# Patient Record
(demographics unavailable — no encounter records)

---

## 2024-12-20 NOTE — PHYSICAL EXAM
[Normal Appearance] : normal appearance [Well Groomed] : well groomed [General Appearance - In No Acute Distress] : no acute distress [] : no rash

## 2024-12-20 NOTE — HISTORY OF PRESENT ILLNESS
[FreeTextEntry1] : Verbal consent given on 12/20/2024 at 15:32 by JACY RODRIGUEZ Patient [place] Provider [place], [place]  Pt comes in for urinary urgency, frequency worse in the morning. Pt currently not on alfuzosin or cialis. Pt having weak stream.   6/12/24 US PVR 56 otherwise normal renal bladder US  9/14/23 PSA 1.14 6/12/24 PSA 1.27

## 2025-02-28 NOTE — HISTORY OF PRESENT ILLNESS
[FreeTextEntry1] : Pt comes in for urinary urgency, frequency worse in the morning. Pt currently not on alfuzosin or cialis. Pt having weak stream. Pt concerned bc of urinary urgency at night while sleeping.  6/12/24 US PVR 56 otherwise normal renal bladder US  9/14/23 PSA 1.14 6/12/24 PSA 1.27

## 2025-06-06 NOTE — HISTORY OF PRESENT ILLNESS
[FreeTextEntry1] : Verbal consent given on 06/06/2025 at 15:40 by JACY RODRIGUEZ. Pt called on phone bc of difficulty connecting via teams.  Pt home Provider [facility]  Pt comes in for urinary urgency, frequency worse in the morning. Pt currently not on alfuzosin or cialis. Pt having weak stream. Pt concerned bc of urinary urgency at night while sleeping. Pt was then started on flomax and symptoms have resolved with the medication. Pt didn't get PSA done.   6/12/24 US PVR 56 otherwise normal renal bladder US  9/14/23 PSA 1.14 6/12/24 PSA 1.27
normal...